# Patient Record
Sex: FEMALE | Race: WHITE | NOT HISPANIC OR LATINO | Employment: FULL TIME | URBAN - METROPOLITAN AREA
[De-identification: names, ages, dates, MRNs, and addresses within clinical notes are randomized per-mention and may not be internally consistent; named-entity substitution may affect disease eponyms.]

---

## 2017-03-30 ENCOUNTER — TELEPHONE (OUTPATIENT)
Dept: FAMILY MEDICINE | Facility: CLINIC | Age: 42
End: 2017-03-30

## 2017-03-30 DIAGNOSIS — H10.9 CONJUNCTIVITIS: Primary | ICD-10-CM

## 2017-03-30 RX ORDER — POLYMYXIN B SULFATE AND TRIMETHOPRIM 1; 10000 MG/ML; [USP'U]/ML
1 SOLUTION OPHTHALMIC 4 TIMES DAILY
Qty: 2 ML | Refills: 0 | Status: SHIPPED | OUTPATIENT
Start: 2017-03-30 | End: 2017-04-06

## 2017-03-30 NOTE — TELEPHONE ENCOUNTER
Reason for call:  Patient reporting a symptom    Symptom or request: poss pink eye    Duration (how long have symptoms been present): 3rd day    Have you been treated for this before? No    Additional comments: Please call pt and advise.     Phone Number patient can be reached at:  Cell number on file:    Telephone Information:   Mobile [phone]       Best Time:  anytime    Can we leave a detailed message on this number:  YES    Call taken on 3/30/2017 at 7:27 AM by Corinne Garcia

## 2017-03-30 NOTE — TELEPHONE ENCOUNTER
RN Conjunctivitis Protocol: Ages 2 and older  Olive Oleary is a 41 year old female is having symptoms reviewed for possible conjunctivitis.      ASSESSMENT/PLAN:  Allergy to Sulfa?  No   1.  Medication Indicated: YES - POLYTRIM 57405-0.1 UNIT/ML-% OP Sol, 1 drop in affected eye(s) 4 times daily while awake x 7 days. .    2.  Education regarding contact precautions, hand washing, avoid wearing contacts until finished with drops or until symptoms resolve, contact clinic if there is no improvement of symptoms within 3 days and if develops eye pain or sensitivity to light.   3.  Follow-up: Contact provider's triage RN if symptoms do not improve after 3 days of antibiotic treatment or if symptoms return after antibiotic therapy is complete.  4.  Patient verbalized understanding of this plan and is agreeable.    SUBJECTIVE:     Conjunctival symptoms: redness, mattering (yellow/green), itching and watery or pus discharge   Location: Both eyes  Onset: 2 days ago  In addition notes: None  Contact Lens use?: No  Complicating factors    Reports:NONE  Denies:Vision changes; not cleared with blinking, Recent  history of eye trauma, Sensitivity to light, Severe eye pain, Fever: none, Eyelid symptoms None      OBJECTIVE:     Encounter handled by: Nurse Triage.     Aleah Nunez RN

## 2017-05-26 ENCOUNTER — HEALTH MAINTENANCE LETTER (OUTPATIENT)
Age: 42
End: 2017-05-26

## 2017-05-31 ENCOUNTER — OFFICE VISIT (OUTPATIENT)
Dept: FAMILY MEDICINE | Facility: OTHER | Age: 42
End: 2017-05-31
Payer: COMMERCIAL

## 2017-05-31 VITALS
HEART RATE: 94 BPM | TEMPERATURE: 98.2 F | OXYGEN SATURATION: 100 % | HEIGHT: 64 IN | SYSTOLIC BLOOD PRESSURE: 124 MMHG | RESPIRATION RATE: 20 BRPM | WEIGHT: 242 LBS | BODY MASS INDEX: 41.32 KG/M2 | DIASTOLIC BLOOD PRESSURE: 80 MMHG

## 2017-05-31 DIAGNOSIS — E66.01 MORBID OBESITY WITH BMI OF 40.0-44.9, ADULT (H): ICD-10-CM

## 2017-05-31 DIAGNOSIS — R21 RASH AND NONSPECIFIC SKIN ERUPTION: Primary | ICD-10-CM

## 2017-05-31 PROCEDURE — 99213 OFFICE O/P EST LOW 20 MIN: CPT | Performed by: NURSE PRACTITIONER

## 2017-05-31 RX ORDER — TRIAMCINOLONE ACETONIDE 1 MG/G
CREAM TOPICAL
Qty: 80 G | Refills: 0 | Status: SHIPPED | OUTPATIENT
Start: 2017-05-31 | End: 2018-10-24

## 2017-05-31 ASSESSMENT — PAIN SCALES - GENERAL: PAINLEVEL: NO PAIN (0)

## 2017-05-31 NOTE — MR AVS SNAPSHOT
"              After Visit Summary   5/31/2017    Olive Oleary    MRN: [id number]           Patient Information     Date Of Birth          1975        Visit Information        Provider Department      5/31/2017 8:40 AM Michelle Moctezuma APRN CNP [facility]        Today's Diagnoses     Rash and nonspecific skin eruption    -  1      Care Instructions    You are due for a pap smear test.  Please schedule this when you are able.     Use the steroid cream for up to 2 weeks.  If it is almost gone by then, may use for 1 additional week, then stop.     Let me know if this does not go away after 2-3 weeks.                   Follow-ups after your visit        Who to contact     If you have questions or need follow up information about today's clinic visit or your schedule please contact [facility] directly at [phone].  Normal or non-critical lab and imaging results will be communicated to you by DermApprovedhart, letter or phone within 4 business days after the clinic has received the results. If you do not hear from us within 7 days, please contact the clinic through DermApprovedhart or phone. If you have a critical or abnormal lab result, we will notify you by phone as soon as possible.  Submit refill requests through Brandtology or call your pharmacy and they will forward the refill request to us. Please allow 3 business days for your refill to be completed.          Additional Information About Your Visit        MyChart Information     Brandtology lets you send messages to your doctor, view your test results, renew your prescriptions, schedule appointments and more. To sign up, go to [email or web address] . Click on \"Log in\" on the left side of the screen, which will take you to the Welcome page. Then click on \"Sign up Now\" on the right side of the page.     You will be asked to enter the access code listed below, as well as some personal information. Please follow the directions to create your username " "and password.     Your access code is: [email or web address]  Expires: 2017  9:00 AM     Your access code will  in 90 days. If you need help or a new code, please call your [facility] or [phone].        Care EveryWhere ID     This is your Care EveryWhere ID. This could be used by other organizations to access your [facility] medical records  [id number]        Your Vitals Were     Pulse Temperature Respirations Height Last Period Pulse Oximetry    94 98.2  F (36.8  C) (Tympanic) 20 5' 3.5\" (1.613 m) (LMP Unknown) 100%    Breastfeeding? BMI (Body Mass Index)                No 42.2 kg/m2           Blood Pressure from Last 3 Encounters:   17 124/80   06/16/15 (!) 137/94   06/04/15 110/66    Weight from Last 3 Encounters:   17 242 lb (109.8 kg)   06/04/15 236 lb (107 kg)   14 235 lb (106.6 kg)              Today, you had the following     No orders found for display         Today's Medication Changes          These changes are accurate as of: 17  9:01 AM.  If you have any questions, ask your nurse or doctor.               Start taking these medicines.        Dose/Directions    triamcinolone 0.1 % cream   Commonly known as:  KENALOG   Used for:  Rash and nonspecific skin eruption   Started by:  Michelle Moctezuma APRN CNP        Apply sparingly to affected area three times.  Use for up to 2 weeks as needed.   Quantity:  80 g   Refills:  0            Where to get your medicines      These medications were sent to [id number] - [place], [place] - [address] [place] [address]  [address] [place] [address]    Hours:  M-F 8:30-6:30; Sat 9-4; closed  Phone:  [phone]     triamcinolone 0.1 % cream                Primary Care Provider Office Phone # Fax #    Mike Gould [phone]       [address] [place] [address]        Thank you!     Thank you for choosing [facility]  for your care. Our goal is always to provide you " with excellent care. Hearing back from our patients is one way we can continue to improve our services. Please take a few minutes to complete the written survey that you may receive in the mail after your visit with us. Thank you!             Your Updated Medication List - Protect others around you: Learn how to safely use, store and throw away your medicines at www.disposemymeds.org.          This list is accurate as of: 5/31/17  9:01 AM.  Always use your most recent med list.                   Brand Name Dispense Instructions for use    triamcinolone 0.1 % cream    KENALOG    80 g    Apply sparingly to affected area three times.  Use for up to 2 weeks as needed.

## 2017-05-31 NOTE — PROGRESS NOTES
SUBJECTIVE:                                                    Olive Oleary is a 42 year old female who presents to clinic today for the following health issues:      Rash      Duration: 2 months, intermittent     Description  Location: arm, right leg, back, stomach, chest  Itching: severe    Intensity:  moderate    Accompanying signs and symptoms: itching, no pain, bumpy.    History (similar episodes/previous evaluation): yes, last October went away on its' own.    Precipitating or alleviating factors:  New exposures:  None  Recent travel: no      Therapies tried and outcome: Benadryl/diphenhydramine cream , calamine spray-  not effective       Problem list and histories reviewed & adjusted, as indicated.  Additional history: none    Patient Active Problem List   Diagnosis     CARDIOVASCULAR SCREENING; LDL GOAL LESS THAN 160     Morbid obesity with BMI of 40.0-44.9, adult (H)     Past Surgical History:   Procedure Laterality Date     C LIGATE FALLOPIAN TUBE  2003     HC TREATMENT INCOMPLETE  SURG, ANY TRIMESTER  2000    Suction dilation and curettage, blighted ovum       Social History   Substance Use Topics     Smoking status: Never Smoker     Smokeless tobacco: Never Used     Alcohol use Yes      Comment: occasional use     Family History   Problem Relation Age of Onset     Family History Negative No family hx of          No current outpatient prescriptions on file.     Allergies   Allergen Reactions     Penicillins      BP Readings from Last 3 Encounters:   17 124/80   06/16/15 (!) 137/94   06/04/15 110/66    Wt Readings from Last 3 Encounters:   17 242 lb (109.8 kg)   06/04/15 236 lb (107 kg)   14 235 lb (106.6 kg)           Reviewed and updated as needed this visit by clinical staff  Tobacco  Allergies  Meds  Soc Hx      Reviewed and updated as needed this visit by Provider         ROS:  C: NEGATIVE for fever, chills, change in weight  INTEGUMENTARY/SKIN: rash as  "above   E/M: NEGATIVE for ear, mouth and throat problems  R: NEGATIVE for significant cough or SOB  CV: NEGATIVE for chest pain, palpitations or peripheral edema    OBJECTIVE:                                                    /80  Pulse 94  Temp 98.2  F (36.8  C) (Tympanic)  Resp 20  Ht 5' 3.5\" (1.613 m)  Wt 242 lb (109.8 kg)  LMP  (LMP Unknown)  SpO2 100%  Breastfeeding? No  BMI 42.2 kg/m2  Body mass index is 42.2 kg/(m^2).  GENERAL: alert, no distress and obese  SKIN: she has three areas of discrete red, raised papules on her left upper arm, abdomen and chest.  They are linear in appearance and are consistent with a contact dermatitis, possibly plant based.  No vesicles or drainage.     Diagnostic Test Results:  none      ASSESSMENT/PLAN:                                                        1. Rash and nonspecific skin eruption  Will treat with topical steroid cream as prescribed.  Follow up if symptoms fail to resolve as expected.   - triamcinolone (KENALOG) 0.1 % cream; Apply sparingly to affected area three times.  Use for up to 2 weeks as needed.  Dispense: 80 g; Refill: 0    FUTURE APPOINTMENTS:       - Follow up if symptoms fail to resolve as expected.     See Patient Instructions    FRANCISCA Le [facility]    "

## 2017-05-31 NOTE — NURSING NOTE
"Chief Complaint   Patient presents with     Derm Problem       Initial /80  Pulse 94  Temp 98.2  F (36.8  C) (Tympanic)  Resp 20  Ht 5' 3.5\" (1.613 m)  Wt 242 lb (109.8 kg)  LMP  (LMP Unknown)  SpO2 100%  Breastfeeding? No  BMI 42.2 kg/m2 Estimated body mass index is 42.2 kg/(m^2) as calculated from the following:    Height as of this encounter: 5' 3.5\" (1.613 m).    Weight as of this encounter: 242 lb (109.8 kg).  Medication Reconciliation: complete   ................Javan Heath LPN,   May 31, 2017,      8:50 AM,   [facility] reviewed - patient asked to schedule her pap smear, patient asked to schedule her mammogram.    "

## 2017-05-31 NOTE — PATIENT INSTRUCTIONS
You are due for a pap smear test.  Please schedule this when you are able.     Use the steroid cream for up to 2 weeks.  If it is almost gone by then, may use for 1 additional week, then stop.     Let me know if this does not go away after 2-3 weeks.

## 2017-09-18 ENCOUNTER — TELEPHONE (OUTPATIENT)
Dept: FAMILY MEDICINE | Facility: OTHER | Age: 42
End: 2017-09-18

## 2017-09-18 NOTE — TELEPHONE ENCOUNTER
9/18/2017    Call Regarding Preventive Health Screening Mammogram and Cervical/PAP    Attempt 1    Message on voicemail     Comments:       Outreach   CC

## 2017-09-26 NOTE — TELEPHONE ENCOUNTER
9/25/2017    Call Regarding Preventive Health Screening Mammogram, Cervical/PAP and Physical    Attempt 1    Message on voicemail     Comments:           Outreach   AT

## 2017-10-02 NOTE — TELEPHONE ENCOUNTER
10/2/2017    Call Regarding Preventive Health Screening Mammogram and Cervical/PAP    Attempt 3    Message on voicemail     Comments:       Outreach   CC

## 2018-10-24 ENCOUNTER — OFFICE VISIT (OUTPATIENT)
Dept: OBGYN | Facility: CLINIC | Age: 43
End: 2018-10-24
Payer: COMMERCIAL

## 2018-10-24 ENCOUNTER — TELEPHONE (OUTPATIENT)
Dept: FAMILY MEDICINE | Facility: OTHER | Age: 43
End: 2018-10-24

## 2018-10-24 VITALS
SYSTOLIC BLOOD PRESSURE: 122 MMHG | BODY MASS INDEX: 41.13 KG/M2 | HEART RATE: 88 BPM | WEIGHT: 235.9 LBS | DIASTOLIC BLOOD PRESSURE: 86 MMHG

## 2018-10-24 DIAGNOSIS — Z12.4 CERVICAL CANCER SCREENING: ICD-10-CM

## 2018-10-24 DIAGNOSIS — R10.2 CHRONIC PELVIC PAIN IN FEMALE: Primary | ICD-10-CM

## 2018-10-24 DIAGNOSIS — G89.29 CHRONIC PELVIC PAIN IN FEMALE: Primary | ICD-10-CM

## 2018-10-24 DIAGNOSIS — N93.9 ABNORMAL UTERINE BLEEDING (AUB): ICD-10-CM

## 2018-10-24 DIAGNOSIS — E66.01 MORBID OBESITY (H): ICD-10-CM

## 2018-10-24 LAB
HBA1C MFR BLD: 5.3 % (ref 0–5.6)
HCG UR QL: NEGATIVE
TSH SERPL DL<=0.005 MIU/L-ACNC: 1.05 MU/L (ref 0.4–4)

## 2018-10-24 PROCEDURE — 88305 TISSUE EXAM BY PATHOLOGIST: CPT | Performed by: OBSTETRICS & GYNECOLOGY

## 2018-10-24 PROCEDURE — 99214 OFFICE O/P EST MOD 30 MIN: CPT | Mod: 25 | Performed by: OBSTETRICS & GYNECOLOGY

## 2018-10-24 PROCEDURE — 58100 BIOPSY OF UTERUS LINING: CPT | Performed by: OBSTETRICS & GYNECOLOGY

## 2018-10-24 PROCEDURE — G0145 SCR C/V CYTO,THINLAYER,RESCR: HCPCS | Performed by: OBSTETRICS & GYNECOLOGY

## 2018-10-24 PROCEDURE — 83036 HEMOGLOBIN GLYCOSYLATED A1C: CPT | Performed by: OBSTETRICS & GYNECOLOGY

## 2018-10-24 PROCEDURE — 36415 COLL VENOUS BLD VENIPUNCTURE: CPT | Performed by: OBSTETRICS & GYNECOLOGY

## 2018-10-24 PROCEDURE — 84443 ASSAY THYROID STIM HORMONE: CPT | Performed by: OBSTETRICS & GYNECOLOGY

## 2018-10-24 PROCEDURE — 81025 URINE PREGNANCY TEST: CPT | Performed by: OBSTETRICS & GYNECOLOGY

## 2018-10-24 PROCEDURE — 99386 PREV VISIT NEW AGE 40-64: CPT | Mod: 25 | Performed by: OBSTETRICS & GYNECOLOGY

## 2018-10-24 PROCEDURE — 87624 HPV HI-RISK TYP POOLED RSLT: CPT | Performed by: OBSTETRICS & GYNECOLOGY

## 2018-10-24 NOTE — PROCEDURES
"Endometrial Biopsy                                                                       Time Out - \"Pause for the Cause\"  Just before the procedure begins, through verbal and active participation of team members, verify:    Initials   Patient Name    JCB   Patient Date of Birth JCB   Procedure to be performed  JCB   Implants, special equipment or special requirements        JCB     Consent:  Written consent signed and scanned into medical record.    Indication: menorrhagia  Pregnancy test:  negative    Using a Graves speculum, the cervix was visualized. The cervix was prepped with Betadine.  A single tooth tenaculum was applied to the anterior lip of the cervix. The endometrial pipelle was advanced through the cervix without difficulty and a sample collected. No additional pass was made.  The tenaculum was removed from the cervix and the tenaculum site made hemostatic with pressure.    EBL: minimal  Complications: none  Pathology: EMB sample was sent to pathology.  Tolerance of Procedure:  Patient did not tolerate the procedure well.     Patient was instructed to call if she experiences any heavy bleeding, severe cramping, or abnormal vaginal discharge.  May take ibuprofen 400-800 mg PO TID PRN for cramping.    Will notify patient of results.    Franco Lange MD  OB/GYN  October 24, 2018, 4:46 PM         "

## 2018-10-24 NOTE — MR AVS SNAPSHOT
"              After Visit Summary   10/24/2018    Olive Oleary    MRN: [id number]           Patient Information     Date Of Birth          1975        Visit Information        Provider Department      10/24/2018 3:30 PM Franco Lange MD [facility]        Today's Diagnoses     Chronic pelvic pain in female    -  1    Abnormal uterine bleeding (AUB)        Morbid obesity (H)          Care Instructions    Please go to lab today after your appt  Please schedule your Ultrasound and Mammogram  Return for results and follow up planning in appx 2 weeks           Follow-ups after your visit        Follow-up notes from your care team     Return in about 2 weeks (around 11/7/2018) for Results follow up.      Future tests that were ordered for you today     Open Future Orders        Priority Expected Expires Ordered    US Pelvic Complete w Transvaginal Routine  10/24/2019 10/24/2018            Who to contact     If you have questions or need follow up information about today's clinic visit or your schedule please contact [facility] directly at [phone].  Normal or non-critical lab and imaging results will be communicated to you by SpamLionhart, letter or phone within 4 business days after the clinic has received the results. If you do not hear from us within 7 days, please contact the clinic through Brigadet or phone. If you have a critical or abnormal lab result, we will notify you by phone as soon as possible.  Submit refill requests through Turned On Digital or call your pharmacy and they will forward the refill request to us. Please allow 3 business days for your refill to be completed.          Additional Information About Your Visit        SpamLionhart Information     Turned On Digital lets you send messages to your doctor, view your test results, renew your prescriptions, schedule appointments and more. To sign up, go to [email or web address] . Click on \"Log in\" on the left side of the screen, " "which will take you to the Welcome page. Then click on \"Sign up Now\" on the right side of the page.     You will be asked to enter the access code listed below, as well as some personal information. Please follow the directions to create your username and password.     Your access code is: [email or web address]  Expires: 2019  4:21 PM     Your access code will  in 90 days. If you need help or a new code, please call your [facility] clinic or [phone].        Care EveryWhere ID     This is your Care EveryWhere ID. This could be used by other organizations to access your [facility] medical records  [id number]        Your Vitals Were     Pulse Last Period BMI (Body Mass Index)             88 10/08/2018 41.13 kg/m2          Blood Pressure from Last 3 Encounters:   10/24/18 122/86   17 124/80   06/16/15 (!) 137/94    Weight from Last 3 Encounters:   10/24/18 235 lb 14.4 oz (107 kg)   17 242 lb (109.8 kg)   06/04/15 236 lb (107 kg)              We Performed the Following     HCG qualitative urine     Hemoglobin A1c     TSH with free T4 reflex        Primary Care Provider Office Phone # Fax #    Mike Gould [phone] [address] [place] [address]        Equal Access to Services     ELY ANDREWS AH: Hadii lila calderon hadasho Soclariali, waaxda luqadaha, qaybta kaalmada cordell, man irene. So [facility] [phone].    ATENCIÓN: Si habla español, tiene a johnson disposición servicios gratuitos de asistencia lingüística. Irlanda al [phone].    We comply with applicable federal civil rights laws and [place] laws. We do not discriminate on the basis of race, color, national origin, age, disability, sex, sexual orientation, or gender identity.            Thank you!     Thank you for choosing [facility]  for your care. Our goal is always to provide you with excellent care. Hearing back from our patients is one way we can continue to improve " our services. Please take a few minutes to complete the written survey that you may receive in the mail after your visit with us. Thank you!             Your Updated Medication List - Protect others around you: Learn how to safely use, store and throw away your medicines at www.disposemymeds.org.      Notice  As of 10/24/2018  4:21 PM    You have not been prescribed any medications.

## 2018-10-24 NOTE — LETTER
[address] [address]  Phone: [phone]    11/05/18    Olive Oleary  [address] [facility] [address]      Dear Olive-     I have reviewed your recent results.  The biopsy is normal. Please follow up with me following the pelvic ultrasound.         Sincerely,      Franco Lange MD

## 2018-10-24 NOTE — LETTER
November 1, 2018    Olive Oleary  [address] [facility] [address]    Dear Olive,  We are happy to inform you that your PAP smear result from 10/24/18 is normal.  We are now able to do a follow up test on PAP smears. The DNA test is for HPV (Human Papilloma Virus). Cervical cancer is closely linked with certain types of HPV. Your results showed no evidence of high risk HPV.  Therefore we recommend you return in 5 years for your next pap smear and HPV test.  You will still need to return to the clinic every year for an annual exam and other preventive tests.  If you have additional questions regarding this result, please call our registered nurse, Isis at [phone].  Sincerely,    Franco Lange MD/lizz

## 2018-10-24 NOTE — PATIENT INSTRUCTIONS
Please go to lab today after your appt  Please schedule your Ultrasound and Mammogram  Return for results and follow up planning in appx 2 weeks

## 2018-10-24 NOTE — PROGRESS NOTES
"Clinic Note     HPI:  Olive Oleary is a 43 year old  woman with history of chronic pelvic pain who presents for annual exam as well as evaluation of her pain.   She reports pelvic pain for at least the past 8 years with worsening over the last 3 years. She has regular 32d menstrual cycles with 5-7 days of bleeding, with flow moderate to heavy, no intramenstrual bleeding. Pain begins midcycle and lasts for two weeks, beginning with abdominal bloating and constipation, that transitions to a bilateral pelvic pain that feels like \"there are aliens in there.\" On the final days of her cycle, prior to bleeding, the constipation transitions to diarrhea, and she reports flu like symptoms.   She denies pain with BMs, denies dyspareunia, no vaginal nor urinary symptoms. No unexpected weight changes, no changes in appetite or diet, no other systemic symptoms.    Ob Hx:  s/p  x2 (, ), and a D&C for a missed AB  Gyn Hx: Patient's last menstrual period was 10/08/2018.  Menses are as above.    Last pap was NIL (, no HPV testing), no history of abnormal paps though rare testing   STI history negative   Using tubal ligation for birth control  PMH:   Past Medical History:   Diagnosis Date     Morbid obesity with BMI of 40.0-44.9, adult (H) 2014     SurgHx:   Past Surgical History:   Procedure Laterality Date     C LIGATE FALLOPIAN TUBE  2003     HC TREATMENT INCOMPLETE  SURG, ANY TRIMESTER  2000    Suction dilation and curettage, blighted ovum     FamHx:   Mother: hysterectomy in 40's for precancer (was obese)  MGM: endometrial cancer in 50's (was obese)    SocHx:   Social History     Social History     Marital status:      Spouse name: N/A     Number of children: N/A     Years of education: N/A     Social History Main Topics     Smoking status: Never Smoker     Smokeless tobacco: Never Used     Alcohol use Yes      Comment: occasional use     Drug use: No     Sexual " activity: Yes     Partners: Male     Birth control/ protection: Female Surgical     Allergies:   Penicillins    Current Medications:   No current outpatient prescriptions on file.     ROS: As described in HPI, otherwise negative for fever/chills, fatigue, dizziness, changes or new deficits in vision, weight changes, worrisome rashes, new lumps or masses, cough/SOB/CP, nausea/vomit, GI distress, dysuria, abnormal vaginal discharge,  neurological deficits, changes in ADL, changes in skin or hair, heat or cold intolerance, or other systemic complaints    EXAM:  Vitals:    10/24/18 1533   BP: 122/86   BP Location: Right arm   Cuff Size: Adult Regular   Pulse: 88   Weight: 235 lb 14.4 oz (107 kg)    Body mass index is 41.13 kg/(m^2).    Gen: Alert, oriented, appropriately interactive, NAD  Neck: soft, no cervical adenopathy, no masses  CV: RRR, no murmurs, no extra heart sounds, 2+ peripheral pulses  Resp: CTAB, good effort without distress   Breasts: no axillary adenopathy, no dominant masses, no skin changes, no nipple discharge, nontender  Abdomen: soft, morbidly obese, non tender, non distended, no masses, no hernias. No inguinal lymphadenopathy. No hepatosplenomegaly  Perineum: no lesions; normal appearing external genitalia, bartholins glands, urethra, skenes glands  Vagina : no masses or lesions or discharge, normally rugated.  Cervix: no masses or lesions or discharge   Bimanual exam:   Urethra nontender   Bladder- nontender and without massess , well supported   Uterus- midline, anteverted, enlarged 14wk uterus, no masses appreciated (limited by habitus), non-tender  Adnexa - no masses or tenderness   No cervical motion tenderness  Lower extremities: non-tender, no edema  Skin: no lesions or rashes    Labs & Imaging:  Results for orders placed or performed in visit on 10/24/18 (from the past 24 hour(s))   HCG qualitative urine   Result Value Ref Range    HCG Qual Urine Negative NEG^Negative     Lab Results    Component Value Date    PAP NIL 2008       ASSESSMENT/PLAN: Olive Oleary is a 43 year old  woman with history of chronic pelvic pain who presents for annual exam as well as evaluation of her pain.    1. Chronic pelvic pain in female  - Present for many years, cyclic beginning mid cycle  - Enlarged uterus on exam, though consistent with pelvic Ultrasound from   - Possible bulky uterus, fibroids or polyp encroaching on endometrium, adenomyosis, less likely endometriosis  - Will collect EMB to rule out malignancy, premalignancy  - Discussed trial of hormonal menstrual suppression vs Depo lupron pending results   - US Pelvic Complete w Transvaginal; Future    2. Abnormal uterine bleeding (AUB)  - EMB as above  - Hormonal suppression vs surgical management pending results  - HCG qualitative urine  - TSH with free T4 reflex  - US Pelvic Complete w Transvaginal; Future    3. Morbid obesity (H)  - Hemoglobin A1c    4. Annual exam  - A1c, lipid panel, HIV, TSH  - PAP today  - Mammogram (normal breast exam today)  - Declined flu vax  - Discussed obesity including medical risks, recommending weight loss    Franco Lange MD  10/24/2018 4:02 PM

## 2018-10-24 NOTE — TELEPHONE ENCOUNTER
Left message for pt to return call, when call is returned please offer her an OB spot with Dr Lange this afternoon in [place] per Juan Ramon. She can still see Juan Ramon but he would prefer her to see OB if possible.     Tamiko Dailey CMA (AAMA)

## 2018-10-27 LAB — COPATH REPORT: NORMAL

## 2018-10-29 LAB
COPATH REPORT: NORMAL
PAP: NORMAL

## 2018-10-31 LAB
FINAL DIAGNOSIS: NORMAL
HPV HR 12 DNA CVX QL NAA+PROBE: NEGATIVE
HPV16 DNA SPEC QL NAA+PROBE: NEGATIVE
HPV18 DNA SPEC QL NAA+PROBE: NEGATIVE
SPECIMEN DESCRIPTION: NORMAL
SPECIMEN SOURCE CVX/VAG CYTO: NORMAL

## 2018-11-29 ENCOUNTER — HOSPITAL ENCOUNTER (OUTPATIENT)
Dept: ULTRASOUND IMAGING | Facility: CLINIC | Age: 43
Discharge: HOME | End: 2018-11-29
Attending: OBSTETRICS & GYNECOLOGY | Admitting: OBSTETRICS & GYNECOLOGY
Payer: COMMERCIAL

## 2018-11-29 DIAGNOSIS — N93.9 ABNORMAL UTERINE BLEEDING (AUB): ICD-10-CM

## 2018-11-29 DIAGNOSIS — G89.29 CHRONIC PELVIC PAIN IN FEMALE: ICD-10-CM

## 2018-11-29 DIAGNOSIS — R10.2 CHRONIC PELVIC PAIN IN FEMALE: ICD-10-CM

## 2018-11-29 PROCEDURE — 76856 US EXAM PELVIC COMPLETE: CPT

## 2018-12-04 ENCOUNTER — TELEPHONE (OUTPATIENT)
Dept: OBGYN | Facility: CLINIC | Age: 43
End: 2018-12-04

## 2018-12-04 NOTE — TELEPHONE ENCOUNTER
Attempted to reach pt to relay below result message from Dr. Lange:    Notes Recorded by Franco Lange MD on 12/3/2018 at 7:50 AM  Please call with results. Her pelvic Ultrasound shows a mildly enlarged uterus consistent with her exam, though no concerning findings. Please ensure that she schedules a follow up appt with me to review results and discuss management of her bleeding.      Franco Lange MD    Left message for pt to return call to clinic.    Tsering Natarajan RN

## 2018-12-05 NOTE — TELEPHONE ENCOUNTER
Left message on both pt's home phone and cell phone to return call to clinic.    Tsering Natarajan RN

## 2018-12-05 NOTE — TELEPHONE ENCOUNTER
Relayed below result note with pt.  Helped pt schedule a f/u appt with Dr. Lange.    Tsering Natarajan RN

## 2018-12-11 ENCOUNTER — OFFICE VISIT (OUTPATIENT)
Dept: OBGYN | Facility: CLINIC | Age: 43
End: 2018-12-11
Payer: COMMERCIAL

## 2018-12-11 VITALS
HEART RATE: 84 BPM | DIASTOLIC BLOOD PRESSURE: 78 MMHG | WEIGHT: 238.1 LBS | BODY MASS INDEX: 41.52 KG/M2 | SYSTOLIC BLOOD PRESSURE: 108 MMHG

## 2018-12-11 DIAGNOSIS — Z12.31 ENCOUNTER FOR SCREENING MAMMOGRAM FOR BREAST CANCER: ICD-10-CM

## 2018-12-11 DIAGNOSIS — N93.9 ABNORMAL UTERINE BLEEDING (AUB): Primary | ICD-10-CM

## 2018-12-11 PROCEDURE — 96372 THER/PROPH/DIAG INJ SC/IM: CPT | Performed by: OBSTETRICS & GYNECOLOGY

## 2018-12-11 PROCEDURE — 99213 OFFICE O/P EST LOW 20 MIN: CPT | Mod: 25 | Performed by: OBSTETRICS & GYNECOLOGY

## 2018-12-11 RX ORDER — MEDROXYPROGESTERONE ACETATE 150 MG/ML
150 INJECTION, SUSPENSION INTRAMUSCULAR
Qty: 1 ML | Refills: 3 | OUTPATIENT
Start: 2018-12-11 | End: 2022-06-15

## 2018-12-11 SDOH — HEALTH STABILITY: MENTAL HEALTH: HOW OFTEN DO YOU HAVE A DRINK CONTAINING ALCOHOL?: 2-4 TIMES A MONTH

## 2018-12-11 NOTE — PROGRESS NOTES
BP: 108/78    LAST PAP/EXAM:   Lab Results   Component Value Date    PAP NIL 10/24/2018     URINE HCG:not indicated    The following medication was given:     MEDICATION: Depo Provera 150mg  ROUTE: IM  SITE: Ventrogluteal - Left  : Slinky  LOT #: [id number]  EXP:07/2020  NEXT INJECTION DUE: 2/26/19 - 3/12/19   Provider: Dr. Lange    Prior to injection, verified patient identity using patient's name and date of birth.  Due to injection administration, patient instructed to remain in clinic for 15 minutes  afterwards, and to report any adverse reaction to me immediately.    Tsering Natarajan RN

## 2018-12-11 NOTE — PROGRESS NOTES
Clinic Note    HPI: Olive Oleary is a 43 year old  woman with history of chronic pelvic pain who presents for follow up (see note dated 10/24/18). She has a hx of pelvic pain for at least the past 8 years with worsening over the last 3 years. She has regular 32d menstrual cycles with 5-7 days of bleeding, with flow moderate to heavy, no intramenstrual bleeding. Pain begins midcycle and lasts for two weeks. The pain can be quite severe and definitely disruptive to her life.      Ob Hx:  s/p  x2 (, ), and a D&C for a missed AB  Gyn Hx: Patient's last menstrual period was 2018.  Menses are as above.                  Last pap was NIL (, no HPV testing), no history of abnormal paps though rare testing                 STI history negative                 Using tubal ligation for birth control    PMH:   Past Medical History:   Diagnosis Date     Morbid obesity with BMI of 40.0-44.9, adult (H) 2014     SurgHx:   Past Surgical History:   Procedure Laterality Date     C LIGATE FALLOPIAN TUBE  2003     HC TREATMENT INCOMPLETE  SURG, ANY TRIMESTER  2000    Suction dilation and curettage, blighted ovum     FamHx:   Family History   Problem Relation Age of Onset     Family History Negative No family hx of      SocHx:   Social History     Socioeconomic History     Marital status:      Spouse name: None     Number of children: None     Years of education: None     Highest education level: None   Social Needs     Financial resource strain: None     Food insecurity - worry: None     Food insecurity - inability: None     Transportation needs - medical: None     Transportation needs - non-medical: None   Occupational History     None   Tobacco Use     Smoking status: Never Smoker     Smokeless tobacco: Never Used   Substance and Sexual Activity     Alcohol use: Yes     Frequency: 2-4 times a month     Comment: occasional use     Drug use: No     Sexual activity:  Yes     Partners: Male     Birth control/protection: Female Surgical   Other Topics Concern     Parent/sibling w/ CABG, MI or angioplasty before 65F 55M? Not Asked   Social History Narrative     None     Allergies:   Penicillins    Current Medications:   Current Outpatient Medications   Medication Sig Dispense Refill     medroxyPROGESTERone (DEPO-PROVERA) 150 MG/ML IM injection Inject 1 mL (150 mg) into the muscle every 3 months 1 mL 3     ROS: As described in HPI, otherwise negative for fever/chills, fatigue, dizziness, changes or new deficits in vision, weight changes, worrisome rashes, new lumps or masses, cough/SOB/CP, nausea/vomit, GI distress, dysuria, abnormal vaginal discharge, constipation/diarrhea, neurological deficits, changes in ADL, changes in skin or hair, heat or cold intolerance, or other systemic complaints    EXAM:  Vitals:    12/11/18 1259   BP: 108/78   BP Location: Right arm   Cuff Size: Adult Regular   Pulse: 84   Weight: 108 kg (238 lb 1.6 oz)    Body mass index is 41.52 kg/m .    Gen: Alert, oriented, appropriately interactive, NAD  CV: RRR, no murmurs, no extra heart sounds, 2+ peripheral pulses  Resp: CTAB, good effort without distress   Abdomen: soft, morbidly obese, non tender, non distended, no masses  Lower extremities: non-tender, no edema  Skin: no lesions or rashes    From exam on 10/24:   Uterus- midline, anteverted, enlarged 14wk uterus, no masses appreciated (limited by habitus), non-tender    Labs & Imaging:  PAP NILM, HPV-  EMB benign  A1c 5.3  TSH 1.05    Pelvic Ultrasound (11/29/18):  The uterus has a somewhat mottled appearance. Probable fibroid is seen in the anterior left lateral aspect of the uterine body measuring 1.0 x 1.3 x 0.8 cm. Small cyst is seen adjacent to the endometrium in the lower uterine segment/lower uterine body. The uterus is otherwise unremarkable measuring 11.5 x 7.5 x 8.6 cm. Endometrial stripe is 1.7 cm in thickness which is upper normal limits for a  premenopausal female.  The right ovary is grossly within normal limits at 4.7 x 3.7 x 3.2 cm. Left ovary is not visualized and cannot be evaluated. No abnormal free fluid collections are seen in the cul-de-sac.                                                              IMPRESSION:  1. Somewhat heterogeneously echoic appearance of the uterus. Small fibroid is seen in the uterine body.  2. Small cyst is noted in the uterus adjacent to the endometrial canal and is of uncertain clinical significance. It appears simple.  3. Endometrial stripe is top normal thickness at 1.7 cm for a premenopausal female. It is otherwise unremarkable.  4. Left ovary is not seen and cannot be evaluated.    ASSESSMENT/PLAN: Olive Oleary is a 43 year old  woman with history of chronic pelvic pain who presents for follow up. Based on results above with mildly globally enlarged and heterogeneously echoic uterus and cyclic pain, I strongly suspect adenomyosis. Her PAP, EMB, A1c, and TSH were normal. She has no S/S of anemia. This is a chronic cyclic process. We reviewed option including: do nothing, hormonal suppression with hormonal contraception, Depo lupron, or surgical management with a hysterectomy. She has no hx of CV disease, is a non smoker, no migraine hx, no contraindications to estrogen. That said, she would prefer to avoid estrogen. We reviewed progesterone only options. We also reviewed what to expect with lupron or surgery. She would prefer to avoid surgery but is concerned that her symptom are such that hormones wont help. We have decided to trial hormonal suppression and follow up pending improvement in symptoms. She has elected to start with Depo provera. I advised her to anticipate a period of possible abnormal spotting as her endometrium adapts to the hormones.     1. Abnormal uterine bleeding (AUB)  - medroxyPROGESTERone (DEPO-PROVERA) 150 MG/ML IM injection; Inject 1 mL (150 mg) into the muscle every 3 months   Dispense: 1 mL; Refill: 3    2. Encounter for screening mammogram for breast cancer  - *MA Screening Digital Bilateral; Future    Franco Lange MD  12/11/2018 1:25 PM

## 2019-03-07 ENCOUNTER — HOSPITAL ENCOUNTER (OUTPATIENT)
Dept: MAMMOGRAPHY | Facility: CLINIC | Age: 44
Discharge: HOME | End: 2019-03-07
Attending: OBSTETRICS & GYNECOLOGY | Admitting: OBSTETRICS & GYNECOLOGY
Payer: COMMERCIAL

## 2019-03-07 DIAGNOSIS — Z12.31 VISIT FOR SCREENING MAMMOGRAM: ICD-10-CM

## 2019-03-07 PROCEDURE — 77063 BREAST TOMOSYNTHESIS BI: CPT

## 2019-03-08 ENCOUNTER — ALLIED HEALTH/NURSE VISIT (OUTPATIENT)
Dept: FAMILY MEDICINE | Facility: OTHER | Age: 44
End: 2019-03-08
Payer: COMMERCIAL

## 2019-03-08 VITALS — SYSTOLIC BLOOD PRESSURE: 120 MMHG | DIASTOLIC BLOOD PRESSURE: 80 MMHG

## 2019-03-08 DIAGNOSIS — Z30.42 ENCOUNTER FOR SURVEILLANCE OF INJECTABLE CONTRACEPTIVE: Primary | ICD-10-CM

## 2019-03-08 PROCEDURE — 96372 THER/PROPH/DIAG INJ SC/IM: CPT

## 2019-03-08 RX ORDER — MEDROXYPROGESTERONE ACETATE 150 MG/ML
150 INJECTION, SUSPENSION INTRAMUSCULAR
Status: DISCONTINUED | OUTPATIENT
Start: 2019-03-08 | End: 2019-08-27

## 2019-03-08 RX ADMIN — MEDROXYPROGESTERONE ACETATE 150 MG: 150 INJECTION, SUSPENSION INTRAMUSCULAR at 13:54

## 2019-03-08 NOTE — PROGRESS NOTES
Prior to injection, verified patient identity using patient's name and date of birth.  Due to injection administration, patient instructed to remain in clinic for 15 minutes  afterwards, and to report any adverse reaction to me immediately.    BP: 120/80    LAST PAP/EXAM:   Lab Results   Component Value Date    PAP NIL 10/24/2018     URINE HCG:not indicated    NEXT INJECTION DUE: 5/24/19 - 6/7/19       Drug Amount Wasted:  None.  Vial/Syringe: Single dose vial  Expiration Date:  02/2020    Ursula Pino MA

## 2019-03-27 ENCOUNTER — HOSPITAL ENCOUNTER (OUTPATIENT)
Dept: MAMMOGRAPHY | Facility: CLINIC | Age: 44
Discharge: HOME | End: 2019-03-27
Attending: OBSTETRICS & GYNECOLOGY | Admitting: OBSTETRICS & GYNECOLOGY
Payer: COMMERCIAL

## 2019-03-27 DIAGNOSIS — R92.8 ABNORMAL MAMMOGRAM: ICD-10-CM

## 2019-03-27 PROCEDURE — 77065 DX MAMMO INCL CAD UNI: CPT | Mod: RT

## 2019-04-04 ENCOUNTER — HOSPITAL ENCOUNTER (OUTPATIENT)
Dept: MAMMOGRAPHY | Facility: CLINIC | Age: 44
End: 2019-04-04
Attending: OBSTETRICS & GYNECOLOGY
Payer: COMMERCIAL

## 2019-04-04 ENCOUNTER — HOSPITAL ENCOUNTER (OUTPATIENT)
Dept: MAMMOGRAPHY | Facility: CLINIC | Age: 44
Discharge: HOME | End: 2019-04-04
Attending: OBSTETRICS & GYNECOLOGY | Admitting: OBSTETRICS & GYNECOLOGY
Payer: COMMERCIAL

## 2019-04-04 DIAGNOSIS — R92.8 ABNORMAL MAMMOGRAM: ICD-10-CM

## 2019-04-04 PROCEDURE — 25000125 ZZHC RX 250: Performed by: RADIOLOGY

## 2019-04-04 PROCEDURE — 40000986 MA POST PROCEDURE RIGHT

## 2019-04-04 PROCEDURE — 19081 BX BREAST 1ST LESION STRTCTC: CPT | Mod: RT

## 2019-04-04 PROCEDURE — 88305 TISSUE EXAM BY PATHOLOGIST: CPT | Performed by: OBSTETRICS & GYNECOLOGY

## 2019-04-04 PROCEDURE — 88305 TISSUE EXAM BY PATHOLOGIST: CPT | Mod: 26 | Performed by: OBSTETRICS & GYNECOLOGY

## 2019-04-04 RX ORDER — LIDOCAINE HYDROCHLORIDE 10 MG/ML
5 INJECTION, SOLUTION INFILTRATION; PERINEURAL ONCE
Status: COMPLETED | OUTPATIENT
Start: 2019-04-04 | End: 2019-04-04

## 2019-04-04 RX ORDER — LIDOCAINE HYDROCHLORIDE AND EPINEPHRINE 10; 10 MG/ML; UG/ML
10 INJECTION, SOLUTION INFILTRATION; PERINEURAL ONCE
Status: COMPLETED | OUTPATIENT
Start: 2019-04-04 | End: 2019-04-04

## 2019-04-04 RX ADMIN — LIDOCAINE HYDROCHLORIDE,EPINEPHRINE BITARTRATE 10 ML: 10; .01 INJECTION, SOLUTION INFILTRATION; PERINEURAL at 10:10

## 2019-04-04 RX ADMIN — LIDOCAINE HYDROCHLORIDE 5 ML: 10 INJECTION, SOLUTION INFILTRATION; PERINEURAL at 10:10

## 2019-04-04 NOTE — DISCHARGE INSTRUCTIONS
After Your Breast Biopsy    Bleeding or bruising: Slight bruising is normal.  If you bleed through the bandage, put direct pressure on the breast.  If you are still bleeding after 20 minutes, call the doctor who ordered the exam.    Bandages: Keep your bandage in place until tomorrow morning.  Do not get it wet.  Leave the tape in place for two days.  On the second day, cover it with a Band-Aid.    Activity: You may shower the morning after the exam.  No heavy activity (lifting, vacuuming) for 24 hours.    Discomfort: Wear your bra overnight to support the breast.  You may take Tylenol (acetaminophen) for pain.  If you had a stereotactic of MR-directed biopsy, you may take aspirin or ibuprofen (Advil, Motrin) the morning after your biopsy, unless your doctor tells you not to.    Infection: Infection is rare.  Symptoms include fever, redness, increasing pain and fluid draining from the biopsy site.  If you have any of these symptoms, please call the doctor who ordered your exam.    Results: Results may take up to three business days.  If you have not heard your results in three days, call the Breast Center Nurse at [phone] or [phone].  In rare cases, we may need to do another biopsy.    Call the doctor who ordered your exam if:    You have bleeding that lasts more than 20 minutes.    You have pain that cannot be controlled.    You have signs of infection (fever, redness, drainage or other signs).    You have not had your results within three days.    Nurse navigator: Our nurse navigator is here to answer your questions and help you set up future clinic visits.  Please call [phone].    Thank you for choosing [facility].  Please call us if you have questions or concerns about your biopsy.

## 2019-04-05 ENCOUNTER — TELEPHONE (OUTPATIENT)
Dept: OBGYN | Facility: CLINIC | Age: 44
End: 2019-04-05

## 2019-04-05 LAB — COPATH REPORT: NORMAL

## 2019-04-05 NOTE — PROGRESS NOTES
After review by Breast Center Radiologist, Dr. Yuriy Murcia, Ms. Oleary was called and  given her 4/4/2019 Right Breast Biopsy Pathology results (Fibrocystic Changes with microcalcifications) and Follow up Recommendations (Annual Screening Mammogram).  Olive denies any post biopsy site issues. I encouraged her to notify her doctor if any breast changes or concerns arise.    Debbie Gracia RN, BSN   [facility]  [phone]

## 2019-04-05 NOTE — TELEPHONE ENCOUNTER
Reviewed patient's breast biopsy results as part of routine following and results were placed:     FINAL DIAGNOSIS:   Breast, right, 3:00, 6.0 cm from nipple, 3.2 cm size, stereotactic core   biopsy:   1. Negative for atypia or malignancy.   2. Fibrocystic changes and microcalcifications identified.         Spoke with Dr. Lange who was okay with relaying negative results to the patient.     Left message for patient to call back for results.     Janneth COONEY RN. . .  4/5/2019, 2:16 PM

## 2019-04-08 NOTE — TELEPHONE ENCOUNTER
Noted that patient was called results with recommendations by Camille WHEELER.    Tristan Muro RN, BSN, OCN  4/8/2019, 4:22 PM

## 2019-04-08 NOTE — TELEPHONE ENCOUNTER
RN Patient Contact    Attempt # 2    Was call answered?  No.  Left message on voicemail with information to call me back.    Janneth COONEY RN. . .  4/8/2019, 3:18 PM

## 2019-05-30 ENCOUNTER — ALLIED HEALTH/NURSE VISIT (OUTPATIENT)
Dept: FAMILY MEDICINE | Facility: OTHER | Age: 44
End: 2019-05-30
Payer: COMMERCIAL

## 2019-05-30 VITALS — BODY MASS INDEX: 40.87 KG/M2 | HEIGHT: 64 IN | DIASTOLIC BLOOD PRESSURE: 78 MMHG | SYSTOLIC BLOOD PRESSURE: 124 MMHG

## 2019-05-30 DIAGNOSIS — Z30.42 ENCOUNTER FOR SURVEILLANCE OF INJECTABLE CONTRACEPTIVE: Primary | ICD-10-CM

## 2019-05-30 PROCEDURE — 96372 THER/PROPH/DIAG INJ SC/IM: CPT

## 2019-05-30 RX ADMIN — MEDROXYPROGESTERONE ACETATE 150 MG: 150 INJECTION, SUSPENSION INTRAMUSCULAR at 13:36

## 2019-05-30 NOTE — PROGRESS NOTES
Prior to injection, verified patient identity using patient's name and date of birth.  Due to injection administration, patient instructed to remain in clinic for 15 minutes  afterwards, and to report any adverse reaction to me immediately.    BP: 124/78    LAST PAP/EXAM:   Lab Results   Component Value Date    PAP NIL 10/24/2018     URINE HCG:not indicated    NEXT INJECTION DUE: 8/15/19 - 8/29/19       Drug Amount Wasted:  None.  Vial/Syringe: Single dose vial  Expiration Date:  02/2020    Given LVG.today.     Ursula Pino MA

## 2019-08-27 ENCOUNTER — ALLIED HEALTH/NURSE VISIT (OUTPATIENT)
Dept: FAMILY MEDICINE | Facility: OTHER | Age: 44
End: 2019-08-27
Payer: COMMERCIAL

## 2019-08-27 DIAGNOSIS — Z30.42 ENCOUNTER FOR SURVEILLANCE OF INJECTABLE CONTRACEPTIVE: Primary | ICD-10-CM

## 2019-08-27 PROCEDURE — 96372 THER/PROPH/DIAG INJ SC/IM: CPT

## 2019-08-27 PROCEDURE — 25000128 H RX IP 250 OP 636: Performed by: FAMILY MEDICINE

## 2019-08-27 RX ORDER — MEDROXYPROGESTERONE ACETATE 150 MG/ML
150 INJECTION, SUSPENSION INTRAMUSCULAR CONTINUOUS PRN
Status: COMPLETED | OUTPATIENT
Start: 2019-08-27 | End: 2019-08-27

## 2019-08-27 RX ADMIN — MEDROXYPROGESTERONE ACETATE 150 MG: 150 INJECTION, SUSPENSION INTRAMUSCULAR at 08:14

## 2019-08-27 NOTE — PROGRESS NOTES
Patientrequests ongoing injections of Depo-Provera  Date of last DMPA:    Verified name and date of birth   Adverse reaction to last shot?  no  Heavy bleeding or more than 14 days bleeding sincelast shot?  no  Wants to continue contraception for another 3 months, knowing that fertility may not return for up to 18 months?  yes  Recent migraine headaches?  no  Breast problems since last shot?  no  Problems with excessive hair loss, acne or facial hair?  No weight 232 lb Pt doctor [facility]    Corinne Elena RN ....................  8/27/2019   8:12 AM

## 2019-11-27 ENCOUNTER — TELEPHONE (OUTPATIENT)
Dept: FAMILY MEDICINE | Facility: CLINIC | Age: 44
End: 2019-11-27

## 2019-11-27 RX ORDER — MEDROXYPROGESTERONE ACETATE 150 MG/ML
150 INJECTION, SUSPENSION INTRAMUSCULAR
Status: CANCELLED | OUTPATIENT
Start: 2019-11-27 | End: 2020-11-21

## 2019-11-27 NOTE — TELEPHONE ENCOUNTER
Patient has no CAM orders for Depo has not been seen been seen since 12/11/2018. Spoke with Nurse patient will need appointment with provider to get Depo.     Attempted to reach patient. LM with family member to call clinic back.     Ursula Pino MA

## 2019-12-02 NOTE — TELEPHONE ENCOUNTER
Anisa, Franco Kaminski MD  You 2 days ago      This patient would like to continue with depo for her heavy bleeding (s/p tubal), though hasn't been seen in nearly a year, I would like her to schedule to discuss. Please reach out to her. thanks      Pt last saw Dr. Lange on 12/11/18.  Left message for pt to return call to clinic. When pt returns call please assist her in scheduling an appt with Dr. Lange.    Tsering Natarajan RN

## 2019-12-04 NOTE — TELEPHONE ENCOUNTER
I offered to help patient set up a appt with Dr Boeck and she declined, she has moved and is 2 hours away.   She will call a clinic close to her to set up a appt .

## 2021-03-20 ENCOUNTER — ALLIED HEALTH/NURSE VISIT (OUTPATIENT)
Dept: FAMILY MEDICINE | Facility: OTHER | Age: 46
End: 2021-03-20
Attending: FAMILY MEDICINE
Payer: OTHER GOVERNMENT

## 2021-03-20 DIAGNOSIS — R05.9 COUGH: Primary | ICD-10-CM

## 2021-03-20 PROCEDURE — U0003 INFECTIOUS AGENT DETECTION BY NUCLEIC ACID (DNA OR RNA); SEVERE ACUTE RESPIRATORY SYNDROME CORONAVIRUS 2 (SARS-COV-2) (CORONAVIRUS DISEASE [COVID-19]), AMPLIFIED PROBE TECHNIQUE, MAKING USE OF HIGH THROUGHPUT TECHNOLOGIES AS DESCRIBED BY CMS-2020-01-R: HCPCS | Mod: ZL | Performed by: FAMILY MEDICINE

## 2021-03-20 PROCEDURE — U0005 INFEC AGEN DETEC AMPLI PROBE: HCPCS | Mod: ZL | Performed by: FAMILY MEDICINE

## 2021-03-20 PROCEDURE — C9803 HOPD COVID-19 SPEC COLLECT: HCPCS

## 2021-03-21 ENCOUNTER — TELEPHONE (OUTPATIENT)
Dept: FAMILY MEDICINE | Facility: OTHER | Age: 46
End: 2021-03-21

## 2021-03-21 LAB
LABORATORY COMMENT REPORT: ABNORMAL
SARS-COV-2 RNA RESP QL NAA+PROBE: NORMAL
SARS-COV-2 RNA RESP QL NAA+PROBE: POSITIVE
SPECIMEN SOURCE: ABNORMAL
SPECIMEN SOURCE: NORMAL

## 2021-03-22 NOTE — TELEPHONE ENCOUNTER
"-Coronavirus (COVID-19) Notification    Caller Name (Patient, parent, daughter/son, grandparent, etc)      Reason for call  Notify of Positive Coronavirus (COVID-19) lab results, assess symptoms,  review  [facility] recommendations    Lab Result    Lab test:  2019-nCoV rRt-PCR or SARS-CoV-2 PCR    Oropharyngeal AND/OR nasopharyngeal swabs is POSITIVE for 2019-nCoV RNA/SARS-COV-2 PCR (COVID-19 virus)    RN Recommendations/Instructions per [facility] Coronavirus COVID-19 recommendations    Brief introduction script  Introduce self then review script:  \"I am calling on behalf of [facility].  We were notified that your Coronavirus test (COVID-19) for was POSITIVE for the virus.  I have some information to relay to you but first I wanted to mention that the [place] Dept of Health will be contacting you shortly [it's possible MD already called Patient] to talk to you more about how you are feeling and other people you have had contact with who might now also have the virus.  Also,  [facility] is Partnering with the [facility] for Covid-19 research, you may be contacted directly by research staff.\"    Assessment (Inquire about Patient's current symptoms)   Assessment   Current Symptoms at time of phone call: (if no symptoms, document No symptoms] Headache, cough, body aches   Symptoms onset (if applicable) 3/19/2021     If at time of call, Patients symptoms hare worsened, the Patient should contact 911 or have someone drive them to Emergency Dept promptly:      If Patient calling 911, inform 911 personal that you have tested positive for the Coronavirus (COVID-19).  Place mask on and await 911 to arrive.    If Emergency Dept, If possible, please have another adult drive you to the Emergency Dept but you need to wear mask when in contact with other people.      Monoclonal Antibody Administration    You may be eligible to receive a new treatment with a monoclonal antibody for preventing " "hospitalization in patients at high risk for complications from COVID-19.   This medication is still experimental and available on a limited basis; it is given through an IV and must be given at an infusion center. Please note that not all people who are eligible will receive the medication since it is in limited supply.     Are you interested in being considered for this medication?  No.   Does the patient fit the criteria: Patient declined    If patient qualifies based on above criteria:  \"We will contact you if you are selected to receive the medication in the next 1-2 days.   This is time sensitive and if you are not selected in the next 1-2 days, you will not receive the medication.  If you do not receive a call to schedule, you have not been selected.\"    Review information with Patient    Your result was positive. This means you have COVID-19 (coronavirus).  We have sent you a letter that reviews the information that I'll be reviewing with you now.    How can I protect others?    If you have symptoms: stay home and away from others (self-isolate) until:    You've had no fever--and no medicine that reduces fever--for 1 full day (24 hours). And       Your other symptoms have gotten better. For example, your cough or breathing has improved. And     At least 10 days have passed since your symptoms started. (If you've been told by a doctor that you have a weak immune system, wait 20 days.)     If you don't have symptoms: Stay home and away from others (self-isolate) until at least 10 days have passed since your first positive COVID-19 test. (Date test collected)    During this time:    Stay in your own room, including for meals. Use your own bathroom if you can.    Stay away from others in your home. No hugging, kissing or shaking hands. No visitors.     Don't go to work, school or anywhere else.     Clean  high touch  surfaces often (doorknobs, counters, handles, etc.). Use a household cleaning spray or wipes. " You'll find a full list on the EPA website at www.epa.gov/pesticide-registration/list-n-disinfectants-use-against-sars-cov-2.     Cover your mouth and nose with a mask, tissue or other face covering to avoid spreading germs.    Wash your hands and face often with soap and water.    Make a list of people you have been in close contact with recently, even if either of you wore a face covering.   ; Start your list from 2 days before you became ill or had a positive test.  ; Include anyone that was within 6 feet of you for a cumulative total of 15 minutes or more in 24 hours. (Example: if you sat next to William for 5 minutes in the morning and 10 minutes in the afternoon, then you were in close contact for 15 minutes total that day. William would be added to your list.)    A public health worker will call or text you. It is important that you answer. They will ask you questions about possible exposures to COVID-19, such as people you have been in direct contact with and places you have visited.    Tell the people on your list that you have COVID-19; they should stay away from others for 14 days starting from the last time they were in contact with you (unless you are told something different from a public health worker).     Caregivers in these groups are at risk for severe illness due to COVID-19:  o People 65 years and older  o People who live in a nursing home or long-term care facility  o People with chronic disease (lung, heart, cancer, diabetes, kidney, liver, immunologic)  o People who have a weakened immune system, including those who:  - Are in cancer treatment  - Take medicine that weakens the immune system, such as corticosteroids  - Had a bone marrow or organ transplant  - Have an immune deficiency  - Have poorly controlled HIV or AIDS  - Are obese (body mass index of 40 or higher)  - Smoke regularly    Caregivers should wear gloves while washing dishes, handling laundry and cleaning bedrooms and  bathrooms.    Wash and dry laundry with special caution. Don't shake dirty laundry, and use the warmest water setting you can.    If you have a weakened immune system, ask your doctor about other actions you should take.    For more tips, go to www.cdc.gov/coronavirus/2019-ncov/downloads/10Things.pdf.    You should not go back to work until you meet the guidelines above for ending your home isolation. You don't need to be retested for COVID-19 before going back to work--studies show that you won't spread the virus if it's been at least 10 days since your symptoms started (or 20 days, if you have a weak immune system).    Employers: This document serves as formal notice of your employee's medical guidelines for going back to work. They must meet the above guidelines before going back to work in person.    How can I take care of myself?    1. Get lots of rest. Drink extra fluids (unless a doctor has told you not to).    2. Take Tylenol (acetaminophen) for fever or pain. If you have liver or kidney problems, ask your family doctor if it's okay to take Tylenol.     Take either:     650 mg (two 325 mg pills) every 4 to 6 hours, or     1,000 mg (two 500 mg pills) every 8 hours as needed.     Note: Don't take more than 3,000 mg in one day. Acetaminophen is found in many medicines (both prescribed and over-the-counter medicines). Read all labels to be sure you don't take too much.    For children, check the Tylenol bottle for the right dose (based on their age or weight).    3. If you have other health problems (like cancer, heart failure, an organ transplant or severe kidney disease): Call your specialty clinic if you don't feel better in the next 2 days.    4. Know when to call 911: Emergency warning signs include:    Trouble breathing or shortness of breath    Pain or pressure in the chest that doesn't go away    Feeling confused like you haven't felt before, or not being able to wake up    Bluish-colored lips or  face    5. Sign up for [email or web address]. We know it's scary to hear that you have COVID-19. We want to track your symptoms to make sure you're okay over the next 2 weeks. Please look for an email from [email or web address]--this is a free, online program that we'll use to keep in touch. To sign up, follow the link in the email. Learn more at [email or web address].    Where can I get more information?    [facility]: [email or web address]    Coronavirus Basics: www.health.[facility].[place]./diseases/coronavirus/basics.html    What to Do If You're Sick: www.cdc.gov/coronavirus/2019-ncov/about/steps-when-sick.html    Ending Home Isolation: www.cdc.gov/coronavirus/2019-ncov/hcp/disposition-in-home-patients.html     Caring for Someone with COVID-19: www.cdc.gov/coronavirus/2019-ncov/if-you-are-sick/care-for-someone.html     [facility] clinical trials (COVID-19 research studies): [email or web address].[email or web address]-clinical-trials     A Positive COVID-19 letter will be sent via [employer] or the mail. (Exception, no letters sent to Presurgerical/Preprocedure Patients)    Brandie Salas LPN

## 2021-05-08 ENCOUNTER — HEALTH MAINTENANCE LETTER (OUTPATIENT)
Age: 46
End: 2021-05-08

## 2021-10-23 ENCOUNTER — HEALTH MAINTENANCE LETTER (OUTPATIENT)
Age: 46
End: 2021-10-23

## 2022-06-04 ENCOUNTER — HEALTH MAINTENANCE LETTER (OUTPATIENT)
Age: 47
End: 2022-06-04

## 2022-06-15 ENCOUNTER — OFFICE VISIT (OUTPATIENT)
Dept: FAMILY MEDICINE | Facility: OTHER | Age: 47
End: 2022-06-15
Attending: STUDENT IN AN ORGANIZED HEALTH CARE EDUCATION/TRAINING PROGRAM
Payer: COMMERCIAL

## 2022-06-15 VITALS
OXYGEN SATURATION: 97 % | WEIGHT: 245.5 LBS | HEART RATE: 79 BPM | BODY MASS INDEX: 42.14 KG/M2 | RESPIRATION RATE: 18 BRPM | SYSTOLIC BLOOD PRESSURE: 120 MMHG | DIASTOLIC BLOOD PRESSURE: 72 MMHG | TEMPERATURE: 98.7 F

## 2022-06-15 DIAGNOSIS — L23.7 CONTACT DERMATITIS DUE TO POISON IVY: Primary | ICD-10-CM

## 2022-06-15 PROCEDURE — 99213 OFFICE O/P EST LOW 20 MIN: CPT | Performed by: STUDENT IN AN ORGANIZED HEALTH CARE EDUCATION/TRAINING PROGRAM

## 2022-06-15 RX ORDER — TRIAMCINOLONE ACETONIDE 1 MG/G
CREAM TOPICAL 2 TIMES DAILY
Qty: 454 G | Refills: 1 | Status: SHIPPED | OUTPATIENT
Start: 2022-06-15

## 2022-06-15 ASSESSMENT — PAIN SCALES - GENERAL: PAINLEVEL: NO PAIN (0)

## 2022-06-15 NOTE — NURSING NOTE
Patient presents to clinic with poison ivy rash to bilateral legs and right forearm for past 3 days.  Medication Rec Complete  Debbie Ryder LPN............6/15/2022 9:47 AM

## 2022-06-15 NOTE — PROGRESS NOTES
Assessment & Plan     Contact dermatitis due to poison ivy  Continue benadryl and calamine spray as needed. Start steroid cream BID for up to 14 days if needed. Discussed risks of prolonged topical steroid use. I do not think the dermatitis is extensive enough to warrant systemic steroids.   - triamcinolone (KENALOG) 0.1 % external cream; Apply topically 2 times daily    Steffi Allen MD  [facility]    Subjective   Olive is a 47 year old who presents for the following health issues   Patient presents to clinic with poison ivy rash to bilateral legs and right forearm for past 3 days.    History of Present Illness       Reason for visit:  Poison ivy  Symptom onset:  1-3 days ago  Symptoms include:  Itchy and bumps  Symptom intensity:  Moderate  Symptom progression:  Worsening  Had these symptoms before:  Yes  Has tried/received treatment for these symptoms:  Yes  Previous treatment was successful:  Yes  Prior treatment description:  Predisozone    She eats 4 or more servings of fruits and vegetables daily.She consumes 1 sweetened beverage(s) daily.She exercises with enough effort to increase her heart rate 10 to 19 minutes per day.  She exercises with enough effort to increase her heart rate 3 or less days per week.   She is taking medications regularly.       Poison icy  - years ago had a severe case, since then seems to get poison ivy rash yearly when exposed  - has been taking benadryl as needed and applying calamine spray, using poison ivy OTC wash   - feels like the rash is still spreading, is on right upper inner arm and bilateral posterior thighs  - sits for work so legs are very itchy and irritating            Review of Systems   Constitutional, HEENT, cardiovascular, pulmonary, gi and gu systems are negative, except as otherwise noted.      Objective    /72 (BP Location: Left arm, Patient Position: Sitting, Cuff Size: Adult Large)   Pulse 79   Temp 98.7  F (37.1  C)  (Tympanic)   Resp 18   Wt 111.4 kg (245 lb 8 oz)   LMP  (LMP Unknown)   SpO2 97%   Breastfeeding No   BMI 42.14 kg/m    Body mass index is 42.14 kg/m .  Physical Exam   GENERAL: healthy, alert and no distress  MS: no gross musculoskeletal defects noted, no edema  SKIN: erythematous pustules scattered across right inner upper arm, bilateral posterior thighs with excoriations.   PSYCH: mentation appears normal, affect normal/bright

## 2022-07-06 ENCOUNTER — MYC MEDICAL ADVICE (OUTPATIENT)
Dept: FAMILY MEDICINE | Facility: OTHER | Age: 47
End: 2022-07-06

## 2022-07-08 NOTE — TELEPHONE ENCOUNTER
If she wants an injection then [facility]?  Norma J. Gosselin, LPN .......  7/8/2022  10:13 AM

## 2024-12-30 ENCOUNTER — HOSPITAL ENCOUNTER (OUTPATIENT)
Dept: GENERAL RADIOLOGY | Facility: OTHER | Age: 49
Discharge: HOME | End: 2024-12-30
Payer: COMMERCIAL

## 2024-12-30 ENCOUNTER — OFFICE VISIT (OUTPATIENT)
Dept: FAMILY MEDICINE | Facility: OTHER | Age: 49
End: 2024-12-30
Attending: NURSE PRACTITIONER
Payer: COMMERCIAL

## 2024-12-30 VITALS
WEIGHT: 239 LBS | HEART RATE: 112 BPM | SYSTOLIC BLOOD PRESSURE: 135 MMHG | RESPIRATION RATE: 19 BRPM | HEIGHT: 64 IN | DIASTOLIC BLOOD PRESSURE: 90 MMHG | BODY MASS INDEX: 40.8 KG/M2 | OXYGEN SATURATION: 96 % | TEMPERATURE: 97.6 F

## 2024-12-30 DIAGNOSIS — R09.89 CHEST CONGESTION: ICD-10-CM

## 2024-12-30 DIAGNOSIS — J18.9 COMMUNITY ACQUIRED PNEUMONIA OF LEFT UPPER LOBE OF LUNG: Primary | ICD-10-CM

## 2024-12-30 DIAGNOSIS — R05.1 ACUTE COUGH: ICD-10-CM

## 2024-12-30 PROCEDURE — 71046 X-RAY EXAM CHEST 2 VIEWS: CPT

## 2024-12-30 RX ORDER — CEFPODOXIME PROXETIL 200 MG/1
200 TABLET, FILM COATED ORAL 2 TIMES DAILY
Qty: 10 TABLET | Refills: 0 | Status: SHIPPED | OUTPATIENT
Start: 2024-12-30 | End: 2025-01-04

## 2024-12-30 RX ORDER — AZITHROMYCIN 250 MG/1
TABLET, FILM COATED ORAL
Qty: 6 TABLET | Refills: 0 | Status: SHIPPED | OUTPATIENT
Start: 2024-12-30 | End: 2025-01-04

## 2024-12-30 ASSESSMENT — ENCOUNTER SYMPTOMS
SINUS PAIN: 0
FEVER: 1
VOMITING: 0
SORE THROAT: 0
CARDIOVASCULAR NEGATIVE: 1
SINUS PRESSURE: 0
MUSCULOSKELETAL NEGATIVE: 1
NAUSEA: 0
CHEST TIGHTNESS: 1
COUGH: 1
DIARRHEA: 0

## 2024-12-30 ASSESSMENT — PAIN SCALES - GENERAL: PAINLEVEL_OUTOF10: NO PAIN (1)

## 2024-12-30 NOTE — PROGRESS NOTES
"Chief Complaint   Patient presents with    Cough     Dry x10 days   Patient is here today for a cough and wheezing that started 10 days ago.    FOOD SECURITY SCREENING QUESTIONS  Hunger Vital Signs:  Within the past 12 months we worried whether our food would run out before we got money to buy more. Never  Within the past 12 months the food we bought just didn't last and we didn't have money to get more. Never  Elenita Willingham 12/30/2024 9:19 AM      Initial BP (!) 135/90 (BP Location: Left arm, Patient Position: Sitting, Cuff Size: Adult Large)   Pulse 112   Temp 97.6  F (36.4  C) (Tympanic)   Resp 19   Ht 1.626 m (5' 4\")   Wt 108.4 kg (239 lb)   LMP 11/30/2024 (Approximate)   SpO2 96%   BMI 41.02 kg/m   Estimated body mass index is 41.02 kg/m  as calculated from the following:    Height as of this encounter: 1.626 m (5' 4\").    Weight as of this encounter: 108.4 kg (239 lb).  Medication Reconciliation: complete    Elenita Willingham   "

## 2024-12-30 NOTE — PROGRESS NOTES
Olive Oleary  1975    ASSESSMENT/PLAN    1. Community acquired pneumonia of left upper lobe of lung (Primary)  - azithromycin (ZITHROMAX) 250 MG tablet; Take 2 tablets (500 mg) by mouth daily for 1 day, THEN 1 tablet (250 mg) daily for 4 days.  Dispense: 6 tablet; Refill: 0  - cefpodoxime (VANTIN) 200 MG tablet; Take 1 tablet (200 mg) by mouth 2 times daily for 5 days.  Dispense: 10 tablet; Refill: 0  2. Acute cough  - XR Chest 2 Views  3. Chest congestion  - XR Chest 2 Views    Chest xray completed  Per radiology: Left upper lobe pneumonia.   -Provided patient with cefpodoxime 200 mg tablets twice daily for 5 days. Additionally, azithromycin for the next 5 days. Side effects reviewed.   - Symptomatic treatment - Encouraged fluids, salt water gargles, honey, humidifier, saline nasal spray, lozenges, tea, soup, smoothies, popsicles, topical vapor rub, rest, etc   - May use over-the-counter Tylenol or ibuprofen PRN  - Follow up as needed for new or worsening symptoms        *Explanation of diagnosis, treatment options and risk and benefits of medications reviewed with patient. Patient agrees with plan of care.  *All questions were answered.    *Red flags symptoms were discussed and patient was advised when they should return for reevaluation or for prompt emergency evaluation.   *Patient was given verbal and written instructions on plan of care. Instructions were printed or are available on [employer] on electronic AVS.   *We discussed potential side effects of any prescribed or recommended therapies, as well as expectations for response to treatments.  *Patient discharged in stable condition    Miguel Sequeira, KATIUSKA, APRN, FNP-C  [facility]    SUBJECTIVE  CHIEF COMPLAINT/ REASON FOR VISIT  Patient presents with:  Cough: Dry x10 days     HISTORY OF PRESENT ILLNESS  Olive Oleary is a pleasant 49 year old female [facility].  Over the last 10 days  "patient has been experiencing a dry cough and chest congestion.  She reports this has remained the same since 10 days ago and has not improved.  She has been using over-the-counter DayQuil and NyQuil as needed.  She reports coughing fits, but no productivity.  He reports no known exposures.  Reports a rattling and wheezing chest with coughing.  Reports no history of COPD.  She did have a fever on Friday and Saturday.    History provided by patient      I have reviewed the nursing notes.  I have reviewed allergies, medication list, problem list, and past medical history.    REVIEW OF SYSTEMS  Review of Systems   Constitutional:  Positive for fever.   HENT:  Negative for ear discharge, ear pain, sinus pressure, sinus pain and sore throat.    Respiratory:  Positive for cough and chest tightness.    Cardiovascular: Negative.    Gastrointestinal:  Negative for diarrhea, nausea and vomiting.   Musculoskeletal: Negative.    Skin: Negative.         VITAL SIGNS  Vitals:    12/30/24 0915   BP: (!) 135/90   BP Location: Left arm   Patient Position: Sitting   Cuff Size: Adult Large   Pulse: 112   Resp: 19   Temp: 97.6  F (36.4  C)   TempSrc: Tympanic   SpO2: 96%   Weight: 108.4 kg (239 lb)   Height: 1.626 m (5' 4\")      Body mass index is 41.02 kg/m .      OBJECTIVE  PHYSICAL EXAM  Physical Exam  Vitals and nursing note reviewed.   Constitutional:       General: She is not in acute distress.     Appearance: Normal appearance. She is normal weight. She is not ill-appearing, toxic-appearing or diaphoretic.   HENT:      Head: Normocephalic and atraumatic.      Right Ear: Tympanic membrane, ear canal and external ear normal. There is no impacted cerumen.      Left Ear: Tympanic membrane, ear canal and external ear normal. There is no impacted cerumen.      Nose: Nose normal. No congestion or rhinorrhea.      Mouth/Throat:      Mouth: Mucous membranes are moist.      Pharynx: Oropharynx is clear. No posterior oropharyngeal erythema. "   Eyes:      Conjunctiva/sclera: Conjunctivae normal.   Cardiovascular:      Rate and Rhythm: Normal rate and regular rhythm.      Pulses: Normal pulses.      Heart sounds: Normal heart sounds.   Pulmonary:      Effort: Pulmonary effort is normal. No respiratory distress.      Breath sounds: Wheezing present. No rhonchi.   Musculoskeletal:      Cervical back: Normal range of motion. No rigidity.   Lymphadenopathy:      Cervical: No cervical adenopathy.   Neurological:      Mental Status: She is alert.            DIAGNOSTICS  Results for orders placed or performed in visit on 12/30/24   XR Chest 2 Views     Status: None    Narrative    Procedure:XR CHEST 2 VIEWS    Clinical history:Female, 49 years, Acute cough; Chest congestion    Technique: Two views are submitted.    Comparison: No relevant prior imaging.    Findings: The cardiac silhouette is within normal limits.    The lungs demonstrate dense consolidation in the left upper lobe. Bony  structures are unremarkable.      Impression    Impression:   Left upper lobe pneumonia.    GLEN GAN MD         SYSTEM ID:  [id number]

## 2025-01-25 ENCOUNTER — HEALTH MAINTENANCE LETTER (OUTPATIENT)
Age: 50
End: 2025-01-25

## (undated) RX ORDER — MEDROXYPROGESTERONE ACETATE 150 MG/ML
INJECTION, SUSPENSION INTRAMUSCULAR
Status: DISPENSED
Start: 2019-08-27